# Patient Record
Sex: MALE | URBAN - METROPOLITAN AREA
[De-identification: names, ages, dates, MRNs, and addresses within clinical notes are randomized per-mention and may not be internally consistent; named-entity substitution may affect disease eponyms.]

---

## 2019-09-20 ENCOUNTER — TELEPHONE (OUTPATIENT)
Dept: TRANSPLANT | Facility: CLINIC | Age: 34
End: 2019-09-20

## 2019-09-20 NOTE — TELEPHONE ENCOUNTER
[id number]: Informed Consent Note     The consent form, including purpose, risks and benefits, was reviewed with Antoine Samano, and all questions were answered before he signed the consent form. The patient understands that the study involves a pre-screening phase, a screening phase, and a donation phase. They understand that re-treatment may be possible.    Present during the discussion were Antoine Samano and myself. A copy of the signed form was provided to the patient. No procedures specific to this study were performed prior to the patient signing the consent form.    Consent Version Date: 7/16/19  Consent obtained by: Jaiden Jacobsen    Date: September 20, 2019  HIPAA authorization signed?: yes  HIPAA authorization version date: 8/25/17    Jaiden Jacobsen    Form 503.03.01 (Version 2)     Effective date: 01AUG2018     Next Review Date: 01AUG2020

## 2019-10-07 ENCOUNTER — RESULTS ONLY (OUTPATIENT)
Dept: LAB | Age: 34
End: 2019-10-07

## 2019-10-07 ENCOUNTER — RESULTS ONLY (OUTPATIENT)
Dept: ONCOLOGY | Facility: CLINIC | Age: 34
End: 2019-10-07

## 2019-10-07 ENCOUNTER — RESULTS ONLY (OUTPATIENT)
Dept: OTHER | Facility: CLINIC | Age: 34
End: 2019-10-07

## 2019-10-08 LAB — CMV IGG SERPL QL IA: <0.2 AI (ref 0–0.8)

## 2019-10-10 LAB
A* LOCUS: NORMAL
A*: NORMAL
ABTEST METHOD: NORMAL
B* LOCUS: NORMAL
B*: NORMAL
BW-1: NORMAL
BW-2: NORMAL
C* LOCUS: NORMAL
C*: NORMAL